# Patient Record
Sex: MALE | Race: WHITE | Employment: OTHER
[De-identification: names, ages, dates, MRNs, and addresses within clinical notes are randomized per-mention and may not be internally consistent; named-entity substitution may affect disease eponyms.]

---

## 2017-01-22 ENCOUNTER — HISTORIC RESULTS (OUTPATIENT)
Dept: ADMINISTRATIVE | Age: 52
End: 2017-01-22

## 2017-05-08 ENCOUNTER — HISTORIC RESULTS (OUTPATIENT)
Dept: ADMINISTRATIVE | Age: 52
End: 2017-05-08

## 2018-01-15 ENCOUNTER — HISTORIC RESULTS (OUTPATIENT)
Dept: ADMINISTRATIVE | Age: 53
End: 2018-01-15

## 2018-02-19 ENCOUNTER — HISTORIC RESULTS (OUTPATIENT)
Dept: ADMINISTRATIVE | Age: 53
End: 2018-02-19

## 2018-04-09 ENCOUNTER — HOSPITAL ENCOUNTER (EMERGENCY)
Facility: OTHER | Age: 53
Discharge: HOME | End: 2018-04-09
Attending: EMERGENCY MEDICINE | Admitting: EMERGENCY MEDICINE
Payer: MEDICARE

## 2018-04-09 ENCOUNTER — APPOINTMENT (OUTPATIENT)
Dept: GENERAL RADIOLOGY | Facility: OTHER | Age: 53
End: 2018-04-09
Attending: EMERGENCY MEDICINE
Payer: MEDICARE

## 2018-04-09 VITALS
TEMPERATURE: 97.9 F | OXYGEN SATURATION: 92 % | SYSTOLIC BLOOD PRESSURE: 113 MMHG | RESPIRATION RATE: 18 BRPM | DIASTOLIC BLOOD PRESSURE: 77 MMHG

## 2018-04-09 DIAGNOSIS — R07.9 ACUTE CHEST PAIN: Primary | ICD-10-CM

## 2018-04-09 DIAGNOSIS — R10.13 ABDOMINAL PAIN, EPIGASTRIC: ICD-10-CM

## 2018-04-09 LAB
ANION GAP SERPL CALCULATED.3IONS-SCNC: 10 MMOL/L (ref 3–14)
BASOPHILS # BLD AUTO: 0.1 10E9/L (ref 0–0.2)
BASOPHILS NFR BLD AUTO: 1.5 %
BUN SERPL-MCNC: 14 MG/DL (ref 7–25)
CALCIUM SERPL-MCNC: 9.1 MG/DL (ref 8.6–10.3)
CHLORIDE SERPL-SCNC: 107 MMOL/L (ref 98–107)
CO2 SERPL-SCNC: 22 MMOL/L (ref 21–31)
CREAT SERPL-MCNC: 0.87 MG/DL (ref 0.7–1.3)
DIFFERENTIAL METHOD BLD: NORMAL
EOSINOPHIL # BLD AUTO: 0.5 10E9/L (ref 0–0.7)
EOSINOPHIL NFR BLD AUTO: 5.2 %
ERYTHROCYTE [DISTWIDTH] IN BLOOD BY AUTOMATED COUNT: 13.8 % (ref 10–15)
GFR SERPL CREATININE-BSD FRML MDRD: >90 ML/MIN/1.7M2
GLUCOSE SERPL-MCNC: 85 MG/DL (ref 70–105)
HCT VFR BLD AUTO: 44.8 % (ref 40–53)
HGB BLD-MCNC: 16 G/DL (ref 13.3–17.7)
IMM GRANULOCYTES # BLD: 0 10E9/L (ref 0–0.4)
IMM GRANULOCYTES NFR BLD: 0.3 %
LYMPHOCYTES # BLD AUTO: 2.7 10E9/L (ref 0.8–5.3)
LYMPHOCYTES NFR BLD AUTO: 31.8 %
MCH RBC QN AUTO: 30.4 PG (ref 26.5–33)
MCHC RBC AUTO-ENTMCNC: 35.7 G/DL (ref 31.5–36.5)
MCV RBC AUTO: 85 FL (ref 78–100)
MONOCYTES # BLD AUTO: 0.7 10E9/L (ref 0–1.3)
MONOCYTES NFR BLD AUTO: 8 %
NEUTROPHILS # BLD AUTO: 4.6 10E9/L (ref 1.6–8.3)
NEUTROPHILS NFR BLD AUTO: 53.2 %
PLATELET # BLD AUTO: 319 10E9/L (ref 150–450)
POTASSIUM SERPL-SCNC: 4.1 MMOL/L (ref 3.5–5.1)
RBC # BLD AUTO: 5.27 10E12/L (ref 4.4–5.9)
SODIUM SERPL-SCNC: 139 MMOL/L (ref 134–144)
TROPONIN I SERPL-MCNC: <0.03 UG/L (ref 0–0.03)
TROPONIN I SERPL-MCNC: <0.03 UG/L (ref 0–0.03)
WBC # BLD AUTO: 8.6 10E9/L (ref 4–11)

## 2018-04-09 PROCEDURE — 93005 ELECTROCARDIOGRAM TRACING: CPT | Performed by: EMERGENCY MEDICINE

## 2018-04-09 PROCEDURE — 85025 COMPLETE CBC W/AUTO DIFF WBC: CPT | Performed by: EMERGENCY MEDICINE

## 2018-04-09 PROCEDURE — 94640 AIRWAY INHALATION TREATMENT: CPT

## 2018-04-09 PROCEDURE — 25000132 ZZH RX MED GY IP 250 OP 250 PS 637: Performed by: EMERGENCY MEDICINE

## 2018-04-09 PROCEDURE — 93010 ELECTROCARDIOGRAM REPORT: CPT | Performed by: INTERNAL MEDICINE

## 2018-04-09 PROCEDURE — 99285 EMERGENCY DEPT VISIT HI MDM: CPT | Mod: 25 | Performed by: EMERGENCY MEDICINE

## 2018-04-09 PROCEDURE — 25000125 ZZHC RX 250: Performed by: EMERGENCY MEDICINE

## 2018-04-09 PROCEDURE — 93005 ELECTROCARDIOGRAM TRACING: CPT | Mod: 76 | Performed by: EMERGENCY MEDICINE

## 2018-04-09 PROCEDURE — 36415 COLL VENOUS BLD VENIPUNCTURE: CPT | Mod: 91 | Performed by: EMERGENCY MEDICINE

## 2018-04-09 PROCEDURE — 84484 ASSAY OF TROPONIN QUANT: CPT | Mod: 91 | Performed by: EMERGENCY MEDICINE

## 2018-04-09 PROCEDURE — 80048 BASIC METABOLIC PNL TOTAL CA: CPT | Performed by: EMERGENCY MEDICINE

## 2018-04-09 PROCEDURE — 71046 X-RAY EXAM CHEST 2 VIEWS: CPT

## 2018-04-09 PROCEDURE — 40000275 ZZH STATISTIC RCP TIME EA 10 MIN

## 2018-04-09 PROCEDURE — 36415 COLL VENOUS BLD VENIPUNCTURE: CPT | Performed by: EMERGENCY MEDICINE

## 2018-04-09 PROCEDURE — 84484 ASSAY OF TROPONIN QUANT: CPT | Performed by: EMERGENCY MEDICINE

## 2018-04-09 PROCEDURE — 99284 EMERGENCY DEPT VISIT MOD MDM: CPT | Mod: Z6 | Performed by: EMERGENCY MEDICINE

## 2018-04-09 RX ORDER — NITROGLYCERIN 0.4 MG/1
0.4 TABLET SUBLINGUAL
COMMUNITY
Start: 2017-06-30

## 2018-04-09 RX ORDER — PREDNISONE 20 MG/1
TABLET ORAL
Qty: 10 TABLET | Refills: 0 | Status: SHIPPED | OUTPATIENT
Start: 2018-04-09 | End: 2019-03-25 | Stop reason: ALTCHOICE

## 2018-04-09 RX ORDER — PREDNISONE 20 MG/1
60 TABLET ORAL ONCE
Status: COMPLETED | OUTPATIENT
Start: 2018-04-09 | End: 2018-04-09

## 2018-04-09 RX ORDER — ALUMINA, MAGNESIA, AND SIMETHICONE 2400; 2400; 240 MG/30ML; MG/30ML; MG/30ML
15 SUSPENSION ORAL ONCE
Status: COMPLETED | OUTPATIENT
Start: 2018-04-09 | End: 2018-04-09

## 2018-04-09 RX ORDER — NITROGLYCERIN 0.4 MG/1
0.4 TABLET SUBLINGUAL EVERY 5 MIN PRN
Status: DISCONTINUED | OUTPATIENT
Start: 2018-04-09 | End: 2018-04-09 | Stop reason: HOSPADM

## 2018-04-09 RX ORDER — AZITHROMYCIN 250 MG/1
TABLET, FILM COATED ORAL
Qty: 6 TABLET | Refills: 0 | Status: SHIPPED | OUTPATIENT
Start: 2018-04-09 | End: 2018-04-14

## 2018-04-09 RX ORDER — IPRATROPIUM BROMIDE AND ALBUTEROL SULFATE 2.5; .5 MG/3ML; MG/3ML
3 SOLUTION RESPIRATORY (INHALATION)
Status: DISCONTINUED | OUTPATIENT
Start: 2018-04-09 | End: 2018-04-09 | Stop reason: HOSPADM

## 2018-04-09 RX ORDER — ALBUTEROL SULFATE 90 UG/1
2 AEROSOL, METERED RESPIRATORY (INHALATION) EVERY 6 HOURS PRN
Qty: 1 INHALER | Refills: 0 | Status: SHIPPED | OUTPATIENT
Start: 2018-04-09

## 2018-04-09 RX ADMIN — PREDNISONE 60 MG: 20 TABLET ORAL at 10:50

## 2018-04-09 RX ADMIN — NITROGLYCERIN 0.4 MG: 0.4 TABLET SUBLINGUAL at 11:53

## 2018-04-09 RX ADMIN — ALUMINUM HYDROXIDE, MAGNESIUM HYDROXIDE, AND DIMETHICONE 15 ML: 400; 400; 40 SUSPENSION ORAL at 12:13

## 2018-04-09 RX ADMIN — NITROGLYCERIN 0.4 MG: 0.4 TABLET SUBLINGUAL at 11:14

## 2018-04-09 RX ADMIN — NITROGLYCERIN 0.4 MG: 0.4 TABLET SUBLINGUAL at 11:26

## 2018-04-09 RX ADMIN — LIDOCAINE HYDROCHLORIDE 15 ML: 20 SOLUTION ORAL; TOPICAL at 12:13

## 2018-04-09 RX ADMIN — IPRATROPIUM BROMIDE AND ALBUTEROL SULFATE 3 ML: .5; 3 SOLUTION RESPIRATORY (INHALATION) at 11:00

## 2018-04-09 NOTE — ED AVS SNAPSHOT
[facility]    [address]    [place] [address]    Phone:  [phone]    Fax:  [phone]                                       Dung Marrero   MRN: [id number]    Department:  [facility]   Date of Visit:  4/9/2018           After Visit Summary Signature Page     I have received my discharge instructions, and my questions have been answered. I have discussed any challenges I see with this plan with the nurse or doctor.    ..........................................................................................................................................  Patient/Patient Representative Signature      ..........................................................................................................................................  Patient Representative Print Name and Relationship to Patient    ..................................................               ................................................  Date                                            Time    ..........................................................................................................................................  Reviewed by Signature/Title    ...................................................              ..............................................  Date                                                            Time

## 2018-04-09 NOTE — ED PROVIDER NOTES
"  History     Chief Complaint   Patient presents with     Shortness of Breath     HPI  Dung Marrero is a 52 year old male who since for evaluation of chest pain or shortness of breath. He reports months of intermittent chest tightness and shortness of breath.  He denies specific change that led him to come to the emergency department today.  Reports a productive cough sometimes of black sputum sometimes of yellow green tinged sputum. Reports hemoptysis that occurred maybe days or weeks ago but unable to give a real timeframe as he states \"I confuse my dates\".  He reports that he has been wandering in the woods after hitching rides with people driving by.  He states he been drinking water melted from ice and has had no food last few days.  He reports hunger pains.  Denies new lower extremity edema.  He does report may be a few days of night sweats.    He had previously been staining [place] and is unable to state why he is now in the woods around [place].  He describes long history of issues with his lungs including long smoking history, asbestos exposure, possible lung cancer etc.    Problem List:    There are no active problems to display for this patient.     Past Medical History:    COPD  Tobacco use  Delusional disorder  Hypertension    Past Surgical History:    Unclear history.    Family History:    No family history on file.    Social History:  Marital Status:   [4]  Social History   Substance Use Topics     Smoking status: Not on file     Smokeless tobacco: Not on file     Alcohol use Not on file      Medications:      No current outpatient prescriptions on file.  He comes with bottles of levofloxacin, nitro and prednisone from 2 months ago with pills still remaining.    Review of Systems  A ten point ROS was completed with pertinent positives and negatives noted in HPI; otherwise negative.     Physical Exam   BP: (!) 134/107  Temp: 97.9  F (36.6  C)  Resp: 26  SpO2: 96 %    Physical Exam  Gen: " Alert, oriented. Non-toxic appearing.   HEENT: Normocephalic. Conjunctivae without injection. No scleral icterus.   CV: RRR, no clear murmur appreciated  Peripheral vascular: No significant lower extremity edema. Warm extremities.   Respiratory: Mild tachypnea noted anxious appearing use of sensory muscle use. There are no wheezes across his lung fields or stridor. No wheezing or stridor appreciated.   Abdomen/GI: Soft, non-tender. Non-distended. No rebound tenderness appreciated.   : No CVA tenderness.   MSK: No gross bony deformity.  No calf tenderness.  Heme/lymph: No ecchymoses noted.    Neuro: Alert, oriented. CN 2-12 grossly intact.   Psych: Not responding to internal stimuli.  Rambling discourse regarding past medical history and social life.     ED Course     ED Course     Procedures       EKG Interpretation:      Interpreted by Truong Cedillo  Time reviewed: 10:40  Symptoms at time of EKG: Chest pain, SOB   Rhythm: normal sinus   Rate: Normal   Axis: Right Axis Deviation  Ectopy: none  Conduction: normal  ST Segments/ T Waves: Inferior St or J point elevation, no other T wave changes  Q Waves: none  Comparison to prior: unchanged from 1/15/2018    Clinical Impression: no acute changes    Results for orders placed or performed during the hospital encounter of 04/09/18 (from the past 24 hour(s))   CBC with platelets differential   Result Value Ref Range    WBC 8.6 4.0 - 11.0 10e9/L    RBC Count 5.27 4.4 - 5.9 10e12/L    Hemoglobin 16.0 13.3 - 17.7 g/dL    Hematocrit 44.8 40.0 - 53.0 %    MCV 85 78 - 100 fl    MCH 30.4 26.5 - 33.0 pg    MCHC 35.7 31.5 - 36.5 g/dL    RDW 13.8 10.0 - 15.0 %    Platelet Count 319 150 - 450 10e9/L    Diff Method Automated Method     % Neutrophils 53.2 %    % Lymphocytes 31.8 %    % Monocytes 8.0 %    % Eosinophils 5.2 %    % Basophils 1.5 %    % Immature Granulocytes 0.3 %    Absolute Neutrophil 4.6 1.6 - 8.3 10e9/L    Absolute Lymphocytes 2.7 0.8 - 5.3 10e9/L    Absolute  Monocytes 0.7 0.0 - 1.3 10e9/L    Absolute Eosinophils 0.5 0.0 - 0.7 10e9/L    Absolute Basophils 0.1 0.0 - 0.2 10e9/L    Abs Immature Granulocytes 0.0 0 - 0.4 10e9/L     Medications   ipratropium - albuterol 0.5 mg/2.5 mg/3 mL (DUONEB) neb solution 3 mL (3 mLs Nebulization Given 4/9/18 1100)   nitroGLYcerin (NITROSTAT) sublingual tablet 0.4 mg (0.4 mg Sublingual Given 4/9/18 1153)   alum & mag hydroxide-simethicone (MYLANTA ES/MAALOX  ES) suspension 15 mL (not administered)     And   lidocaine (viscous) (XYLOCAINE) 2 % solution 15 mL (not administered)   predniSONE (DELTASONE) tablet 60 mg (60 mg Oral Given 4/9/18 1050)     Assessments & Plan (with Medical Decision Making)   Dung Marrero is a 52 year old M with medical history significant for delusion disorder, COPD presenting for evaluation of chest pain and SOB. Differential considered included COPD, PE, ACS, pulmonary edema, heart failure, pneumonia, influenza, neoplasm, cardiac arrhythmia among others. I reviewed nursing notes and patient's vitals on arrival. Examination was significant for nontoxic-appearing male with mild tachypnea and large upper airway sounds without focal wheezing over the bilateral lung fields.    Labs are notable for normal CBC, BMP and troponin. Chest x-ray showed hyperinflated lungs with scattered nodules. I compared this to patient's previous CTs which is notable for diffuse areas of lung scarring.  No indication for emergent CT as he has had multiple over the last year.    Reports nitro does improve his symptoms. On repeat history patient reports 1 week of ongoing exertional chest discomfort in addition to his shortness of breath. Repeat EKG showed no significant change with ongoing similar symptoms. GI cocktail was given as his symptoms seem to be acutely worse after starting to eat a meat ball.  GI cocktail completely resolved all of his chest pain.   In addition I reviewed patient's care everywhere records which are notable for  dobutamine stress test on the February 20th less than 2 months ago. This was negative for ischemia.  EF of greater than 75% and no ischemic changes on his EKG. This was despite patient developing 10 out of 10 chest pain during the study.  In addition he has had multiple other studies including a CT PE study at the end of February. I have low suspicion for new PE in this patient.    Repeat troponin was also within normal limits.  Patient's HEART score 3, making adverse cardiac outcome unlikely..    He was seen by  and was given resources for a facility to stay at for the evening.    He was discharged with ranitidine, prednisone, inhaler and azithromycin.  Patient reports not having insurance probably for the next few weeks.  Return with any worsening pain ability to follow-up with his previous providers as able.      New Prescriptions    ALBUTEROL (PROAIR HFA/PROVENTIL HFA/VENTOLIN HFA) 108 (90 BASE) MCG/ACT INHALER    Inhale 2 puffs into the lungs every 6 hours as needed for shortness of breath / dyspnea or wheezing    AZITHROMYCIN (ZITHROMAX Z-YANIQUE) 250 MG TABLET    Two tablets on the first day, then one tablet daily for the next 4 days    PREDNISONE (DELTASONE) 20 MG TABLET    Take two tablets (= 40mg) each day for 5 (five) days    RANITIDINE (ZANTAC) 150 MG TABLET    Take 2 tablets (300 mg) by mouth At Bedtime for 10 days     Final diagnoses:   Acute chest pain   Abdominal pain, epigastric     4/9/2018   [facility]  04/09/18 5791

## 2018-04-09 NOTE — ED NOTES
"Pt has only 1 lung(left), presents with SOB, Hx of COPD, productive cough-pt reports \"black stuff\". SOB been getting worse of the last 2 months, Pt reports he had pneumonia \"a little while ago\" SP02 98% on R/A  "

## 2018-04-09 NOTE — DISCHARGE INSTRUCTIONS
Please take Zantac for your stomach acid.  Gave you a prescription of prednisone and inhaler to help with her breathing.  Please follow-up with your [facility] provider.   has given you resources to find a place tonight at the shelter.

## 2018-04-09 NOTE — ED NOTES
Social Service consult ordered per MD. RAMIREZ.S contacted. [place]

## 2018-04-09 NOTE — PROGRESS NOTES
:    Received referral to see patient regarding possible resources as he is homeless.  Met with patient room to discuss discharge plan.  Patient stated he is from the [place] area but has been all over the [place].  Patients stated he is trying to get back to [place] as he is having problems with his social security.  I offered patient to go to the [facility] and he was receptive.  He stated he has never been there before.  I gave him contact information as he has to go there in person for an assessment first. Patient did not have any other questions or concerns at this time.  I gave MD and nurse update.  They were unsure at that time if patient might be admitted.    JIMENA Camejo on 4/9/2018 at 11:22 AM

## 2018-04-09 NOTE — ED NOTES
"Pt does not have a permanent residence, dressed in many layers today, pt was living in the woods, and is trying to get to [place] to \"straighten out his social security\"  "

## 2019-03-25 ENCOUNTER — HOSPITAL ENCOUNTER (EMERGENCY)
Facility: OTHER | Age: 54
Discharge: HOME | End: 2019-03-25
Attending: PHYSICIAN ASSISTANT | Admitting: PHYSICIAN ASSISTANT
Payer: MEDICARE

## 2019-03-25 ENCOUNTER — APPOINTMENT (OUTPATIENT)
Dept: GENERAL RADIOLOGY | Facility: OTHER | Age: 54
End: 2019-03-25
Attending: PHYSICIAN ASSISTANT
Payer: MEDICARE

## 2019-03-25 VITALS
TEMPERATURE: 98.3 F | SYSTOLIC BLOOD PRESSURE: 114 MMHG | OXYGEN SATURATION: 99 % | DIASTOLIC BLOOD PRESSURE: 81 MMHG | HEART RATE: 67 BPM | RESPIRATION RATE: 18 BRPM

## 2019-03-25 DIAGNOSIS — R09.02 HYPOXIA: ICD-10-CM

## 2019-03-25 DIAGNOSIS — J44.1 COPD EXACERBATION (H): ICD-10-CM

## 2019-03-25 LAB
ALBUMIN SERPL-MCNC: 3.9 G/DL (ref 3.5–5.7)
ALP SERPL-CCNC: 53 U/L (ref 34–104)
ALT SERPL W P-5'-P-CCNC: 13 U/L (ref 7–52)
ANION GAP SERPL CALCULATED.3IONS-SCNC: 4 MMOL/L (ref 3–14)
AST SERPL W P-5'-P-CCNC: 14 U/L (ref 13–39)
BASOPHILS # BLD AUTO: 0.1 10E9/L (ref 0–0.2)
BASOPHILS NFR BLD AUTO: 1.7 %
BILIRUB SERPL-MCNC: 0.4 MG/DL (ref 0.3–1)
BUN SERPL-MCNC: 8 MG/DL (ref 7–25)
CALCIUM SERPL-MCNC: 8.7 MG/DL (ref 8.6–10.3)
CHLORIDE SERPL-SCNC: 105 MMOL/L (ref 98–107)
CO2 SERPL-SCNC: 29 MMOL/L (ref 21–31)
CREAT SERPL-MCNC: 0.96 MG/DL (ref 0.7–1.3)
D DIMER PPP DDU-MCNC: <200 NG/ML D-DU (ref 0–230)
DIFFERENTIAL METHOD BLD: NORMAL
EOSINOPHIL # BLD AUTO: 0.2 10E9/L (ref 0–0.7)
EOSINOPHIL NFR BLD AUTO: 3.4 %
ERYTHROCYTE [DISTWIDTH] IN BLOOD BY AUTOMATED COUNT: 14.1 % (ref 10–15)
GFR SERPL CREATININE-BSD FRML MDRD: 82 ML/MIN/{1.73_M2}
GLUCOSE SERPL-MCNC: 148 MG/DL (ref 70–105)
HCO3 BLD-SCNC: 22 MMOL/L (ref 22–28)
HCT VFR BLD AUTO: 42.5 % (ref 40–53)
HGB BLD-MCNC: 14.3 G/DL (ref 13.3–17.7)
IMM GRANULOCYTES # BLD: 0 10E9/L (ref 0–0.4)
IMM GRANULOCYTES NFR BLD: 0.2 %
INR PPP: 1.04 (ref 0–1.3)
LYMPHOCYTES # BLD AUTO: 1.7 10E9/L (ref 0.8–5.3)
LYMPHOCYTES NFR BLD AUTO: 35.6 %
MCH RBC QN AUTO: 29.9 PG (ref 26.5–33)
MCHC RBC AUTO-ENTMCNC: 33.6 G/DL (ref 31.5–36.5)
MCV RBC AUTO: 89 FL (ref 78–100)
MONOCYTES # BLD AUTO: 0.3 10E9/L (ref 0–1.3)
MONOCYTES NFR BLD AUTO: 6.5 %
NEUTROPHILS # BLD AUTO: 2.5 10E9/L (ref 1.6–8.3)
NEUTROPHILS NFR BLD AUTO: 52.6 %
O2/TOTAL GAS SETTING VFR VENT: ABNORMAL %
OXYHGB MFR BLD: 89 %
PCO2 BLD: 34 MM HG (ref 35–45)
PH BLD: 7.44 PH (ref 7.35–7.45)
PLATELET # BLD AUTO: 243 10E9/L (ref 150–450)
PO2 BLD: 75 MM HG (ref 83–108)
POTASSIUM SERPL-SCNC: 3.9 MMOL/L (ref 3.5–5.1)
PROT SERPL-MCNC: 6.6 G/DL (ref 6.4–8.9)
RBC # BLD AUTO: 4.79 10E12/L (ref 4.4–5.9)
SODIUM SERPL-SCNC: 138 MMOL/L (ref 134–144)
TROPONIN I SERPL-MCNC: <0.03 UG/L (ref 0–0.03)
TROPONIN I SERPL-MCNC: <0.03 UG/L (ref 0–0.03)
WBC # BLD AUTO: 4.8 10E9/L (ref 4–11)

## 2019-03-25 PROCEDURE — 84484 ASSAY OF TROPONIN QUANT: CPT | Performed by: PHYSICIAN ASSISTANT

## 2019-03-25 PROCEDURE — 36415 COLL VENOUS BLD VENIPUNCTURE: CPT | Performed by: PHYSICIAN ASSISTANT

## 2019-03-25 PROCEDURE — 40000275 ZZH STATISTIC RCP TIME EA 10 MIN

## 2019-03-25 PROCEDURE — 80053 COMPREHEN METABOLIC PANEL: CPT | Performed by: PHYSICIAN ASSISTANT

## 2019-03-25 PROCEDURE — 25000125 ZZHC RX 250: Performed by: PHYSICIAN ASSISTANT

## 2019-03-25 PROCEDURE — 85379 FIBRIN DEGRADATION QUANT: CPT | Performed by: PHYSICIAN ASSISTANT

## 2019-03-25 PROCEDURE — 99283 EMERGENCY DEPT VISIT LOW MDM: CPT | Mod: Z6 | Performed by: PHYSICIAN ASSISTANT

## 2019-03-25 PROCEDURE — 36415 COLL VENOUS BLD VENIPUNCTURE: CPT | Mod: 91 | Performed by: PHYSICIAN ASSISTANT

## 2019-03-25 PROCEDURE — 36600 WITHDRAWAL OF ARTERIAL BLOOD: CPT | Performed by: PHYSICIAN ASSISTANT

## 2019-03-25 PROCEDURE — 84484 ASSAY OF TROPONIN QUANT: CPT | Mod: 91 | Performed by: PHYSICIAN ASSISTANT

## 2019-03-25 PROCEDURE — 93005 ELECTROCARDIOGRAM TRACING: CPT | Performed by: PHYSICIAN ASSISTANT

## 2019-03-25 PROCEDURE — 82805 BLOOD GASES W/O2 SATURATION: CPT | Performed by: PHYSICIAN ASSISTANT

## 2019-03-25 PROCEDURE — 71046 X-RAY EXAM CHEST 2 VIEWS: CPT

## 2019-03-25 PROCEDURE — 93010 ELECTROCARDIOGRAM REPORT: CPT | Performed by: INTERNAL MEDICINE

## 2019-03-25 PROCEDURE — 96374 THER/PROPH/DIAG INJ IV PUSH: CPT | Performed by: PHYSICIAN ASSISTANT

## 2019-03-25 PROCEDURE — 25000128 H RX IP 250 OP 636: Performed by: PHYSICIAN ASSISTANT

## 2019-03-25 PROCEDURE — 25800030 ZZH RX IP 258 OP 636: Performed by: PHYSICIAN ASSISTANT

## 2019-03-25 PROCEDURE — 85025 COMPLETE CBC W/AUTO DIFF WBC: CPT | Performed by: PHYSICIAN ASSISTANT

## 2019-03-25 PROCEDURE — 85610 PROTHROMBIN TIME: CPT | Performed by: PHYSICIAN ASSISTANT

## 2019-03-25 PROCEDURE — 99285 EMERGENCY DEPT VISIT HI MDM: CPT | Mod: 25 | Performed by: PHYSICIAN ASSISTANT

## 2019-03-25 PROCEDURE — 94640 AIRWAY INHALATION TREATMENT: CPT

## 2019-03-25 RX ORDER — SODIUM CHLORIDE 9 MG/ML
INJECTION, SOLUTION INTRAVENOUS CONTINUOUS
Status: DISCONTINUED | OUTPATIENT
Start: 2019-03-25 | End: 2019-03-25 | Stop reason: HOSPADM

## 2019-03-25 RX ORDER — METHYLPREDNISOLONE SODIUM SUCCINATE 125 MG/2ML
125 INJECTION, POWDER, LYOPHILIZED, FOR SOLUTION INTRAMUSCULAR; INTRAVENOUS ONCE
Status: COMPLETED | OUTPATIENT
Start: 2019-03-25 | End: 2019-03-25

## 2019-03-25 RX ORDER — IPRATROPIUM BROMIDE AND ALBUTEROL SULFATE 2.5; .5 MG/3ML; MG/3ML
3 SOLUTION RESPIRATORY (INHALATION) ONCE
Status: COMPLETED | OUTPATIENT
Start: 2019-03-25 | End: 2019-03-25

## 2019-03-25 RX ORDER — PREDNISONE 10 MG/1
TABLET ORAL
Qty: 45 TABLET | Refills: 0 | Status: SHIPPED | OUTPATIENT
Start: 2019-03-25

## 2019-03-25 RX ORDER — IPRATROPIUM BROMIDE AND ALBUTEROL SULFATE 2.5; .5 MG/3ML; MG/3ML
1 SOLUTION RESPIRATORY (INHALATION) 4 TIMES DAILY PRN
Qty: 90 VIAL | Refills: 3 | Status: SHIPPED | OUTPATIENT
Start: 2019-03-25

## 2019-03-25 RX ORDER — BUDESONIDE 0.5 MG/2ML
0.5 INHALANT ORAL ONCE
Status: COMPLETED | OUTPATIENT
Start: 2019-03-25 | End: 2019-03-25

## 2019-03-25 RX ADMIN — IPRATROPIUM BROMIDE AND ALBUTEROL SULFATE 3 ML: .5; 3 SOLUTION RESPIRATORY (INHALATION) at 10:56

## 2019-03-25 RX ADMIN — METHYLPREDNISOLONE SODIUM SUCCINATE 125 MG: 125 INJECTION, POWDER, FOR SOLUTION INTRAMUSCULAR; INTRAVENOUS at 11:13

## 2019-03-25 RX ADMIN — SODIUM CHLORIDE: 9 INJECTION, SOLUTION INTRAVENOUS at 11:12

## 2019-03-25 RX ADMIN — BUDESONIDE 0.5 MG: 0.5 INHALANT RESPIRATORY (INHALATION) at 10:56

## 2019-03-25 ASSESSMENT — ENCOUNTER SYMPTOMS
ABDOMINAL PAIN: 0
CONFUSION: 0
WOUND: 0
ADENOPATHY: 0
CHEST TIGHTNESS: 1
BACK PAIN: 0
HEMATURIA: 0
FEVER: 0
BRUISES/BLEEDS EASILY: 0
COUGH: 1
SHORTNESS OF BREATH: 1
CHILLS: 0
WHEEZING: 0

## 2019-03-25 NOTE — ED AVS SNAPSHOT
[facility]  [address]  [place] [address]  Phone:  [phone]  Fax:  [phone]                                    Dung Marrero   MRN: [id number]    Department:  [facility]   Date of Visit:  3/25/2019           After Visit Summary Signature Page    I have received my discharge instructions, and my questions have been answered. I have discussed any challenges I see with this plan with the nurse or doctor.    ..........................................................................................................................................  Patient/Patient Representative Signature      ..........................................................................................................................................  Patient Representative Print Name and Relationship to Patient    ..................................................               ................................................  Date                                   Time    ..........................................................................................................................................  Reviewed by Signature/Title    ...................................................              ..............................................  Date                                               Time          22EPIC Rev 08/18

## 2019-03-25 NOTE — ED PROVIDER NOTES
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  Furthermore he has a history of exposure excessive tobacco use and cigarette use.  He has quit a year ago but continues with the cigarettes.  Most of his medical evaluation has been through [facility].  He reports having significant pulmonary and cardiac history with 23 heart attacks, and this extends in the past.  Has had lung cancer with part of his right lung removed.  Today he is actually here for a friend who is undergoing surgery.  The patient was walking the sood when Dr. Metz noted he was somewhat hypoxic.  He asked the patient if he was having difficulties and the patient said yes at that point the patient was placed in a wheelchair and escorted to the ER for further evaluation.              Allergies:  Allergies   Allergen Reactions     Aspirin Anaphylaxis     Latex Anaphylaxis     Penicillins Anaphylaxis     No Clinical Screening - See Comments      Perfumes: cause nose bleeds       Problem List:    There are no active problems to display for this patient.       Past Medical History:    No past medical history on file.    Past Surgical History:    No past surgical history on file.    Family History:    No family history on file.    Social History:  Marital Status:   [4]  Social History     Tobacco Use     Smoking status: Not on file   Substance Use Topics     Alcohol use: Not on file     Drug use: Not on file        Medications:      albuterol (PROAIR HFA/PROVENTIL HFA/VENTOLIN HFA) 108 (90 BASE) MCG/ACT Inhaler   predniSONE (DELTASONE) 20 MG tablet   TRAMADOL HCL PO   LEVOFLOXACIN PO   nitroGLYcerin (NITROSTAT) 0.4 MG sublingual tablet         Review of Systems   Constitutional: Negative for chills and fever.   HENT: Negative for congestion.     Eyes: Negative for visual disturbance.   Respiratory: Positive for cough, chest tightness and shortness of breath. Negative for wheezing.    Cardiovascular: Positive for chest pain.   Gastrointestinal: Negative for abdominal pain.   Genitourinary: Negative for hematuria.   Musculoskeletal: Negative for back pain.   Skin: Negative for rash and wound.   Neurological: Negative for syncope.   Hematological: Negative for adenopathy. Does not bruise/bleed easily.   Psychiatric/Behavioral: Negative for confusion.       Physical Exam   BP: (!) 133/95  Pulse: 78  Heart Rate: 70  Temp: 98.3  F (36.8  C)  Resp: 22  SpO2: 97 %    Vitals:    03/25/19 1215 03/25/19 1230 03/25/19 1245 03/25/19 1300   BP: 114/82 117/76 125/71 114/81   Pulse: 66 65 69 67   Resp:       Temp:       TempSrc:       SpO2: 97% 98% 97% 99%       Physical Exam   Constitutional: He is oriented to person, place, and time. He appears well-developed and well-nourished. No distress.   HENT:   Head: Normocephalic and atraumatic.   Eyes: Conjunctivae are normal. No scleral icterus.   Neck: Neck supple.   Cardiovascular: Normal rate and regular rhythm.   Pulmonary/Chest: Effort normal. No respiratory distress. He has no rales.   Lung sounds decreased throughout.  SaO2 is 97% on room air.   Abdominal: Soft. There is no tenderness.   Musculoskeletal: Normal range of motion. He exhibits no deformity.   Lymphadenopathy:     He has no cervical adenopathy.   Neurological: He is alert and oriented to person, place, and time.   Skin: Skin is warm and dry. Capillary refill takes less than 2 seconds. No rash noted. He is not diaphoretic.   Psychiatric: He has a normal mood and affect.       ED Course     ED Course as of Mar 25 1323   Mon Mar 25, 2019   1322 Sodium: 138     Procedures          EKG shows normal sinus rhythm.  Rightward axis.  Heart rate is 77.  No previous EKG for comparison.         Results for orders placed or performed during the hospital encounter of  03/25/19 (from the past 24 hour(s))   CBC with platelets differential   Result Value Ref Range    WBC 4.8 4.0 - 11.0 10e9/L    RBC Count 4.79 4.4 - 5.9 10e12/L    Hemoglobin 14.3 13.3 - 17.7 g/dL    Hematocrit 42.5 40.0 - 53.0 %    MCV 89 78 - 100 fl    MCH 29.9 26.5 - 33.0 pg    MCHC 33.6 31.5 - 36.5 g/dL    RDW 14.1 10.0 - 15.0 %    Platelet Count 243 150 - 450 10e9/L    Diff Method Automated Method     % Neutrophils 52.6 %    % Lymphocytes 35.6 %    % Monocytes 6.5 %    % Eosinophils 3.4 %    % Basophils 1.7 %    % Immature Granulocytes 0.2 %    Absolute Neutrophil 2.5 1.6 - 8.3 10e9/L    Absolute Lymphocytes 1.7 0.8 - 5.3 10e9/L    Absolute Monocytes 0.3 0.0 - 1.3 10e9/L    Absolute Eosinophils 0.2 0.0 - 0.7 10e9/L    Absolute Basophils 0.1 0.0 - 0.2 10e9/L    Abs Immature Granulocytes 0.0 0 - 0.4 10e9/L   INR   Result Value Ref Range    INR 1.04 0 - 1.3   Comprehensive metabolic panel   Result Value Ref Range    Sodium 138 134 - 144 mmol/L    Potassium 3.9 3.5 - 5.1 mmol/L    Chloride 105 98 - 107 mmol/L    Carbon Dioxide 29 21 - 31 mmol/L    Anion Gap 4 3 - 14 mmol/L    Glucose 148 (H) 70 - 105 mg/dL    Urea Nitrogen 8 7 - 25 mg/dL    Creatinine 0.96 0.70 - 1.30 mg/dL    GFR Estimate 82 >60 mL/min/[1.73_m2]    GFR Estimate If Black >90 >60 mL/min/[1.73_m2]    Calcium 8.7 8.6 - 10.3 mg/dL    Bilirubin Total 0.4 0.3 - 1.0 mg/dL    Albumin 3.9 3.5 - 5.7 g/dL    Protein Total 6.6 6.4 - 8.9 g/dL    Alkaline Phosphatase 53 34 - 104 U/L    ALT 13 7 - 52 U/L    AST 14 13 - 39 U/L   D-Dimer (HI,GH)   Result Value Ref Range    D-Dimer ng/mL <200 0 - 230 ng/ml D-DU   Troponin I   Result Value Ref Range    Troponin I ES <0.030 0.000 - 0.034 ug/L   Blood gas arterial and oxyhgb   Result Value Ref Range    pH Arterial 7.44 7.35 - 7.45 pH    pCO2 Arterial 34 (L) 35 - 45 mm Hg    pO2 Arterial 75 (L) 83 - 108 mm Hg    Bicarbonate Arterial 22 22 - 28 mmol/L    FIO2 Room Air     Oxyhemoglobin Arterial 89 (L) >95 %   XR Chest 2  Views    Narrative    PROCEDURE:  XR CHEST 2 VW    HISTORY:  hypoxia.     COMPARISON:  4/9/2018    FINDINGS:   The cardiac silhouette is normal in size. The pulmonary vasculature is  normal.  The lungs are hyperinflated with emphysematous changes,  similar to the prior study. No pleural effusion or pneumothorax.      Impression    IMPRESSION:  COPD. No acute change.      MAGDALENA GOODMAN MD   Troponin I (second draw)   Result Value Ref Range    Troponin I ES <0.030 0.000 - 0.034 ug/L       Medications   sodium chloride 0.9% infusion (not administered)   ipratropium - albuterol 0.5 mg/2.5 mg/3 mL (DUONEB) neb solution 3 mL (not administered)   budesonide (PULMICORT) neb solution 0.5 mg (not administered)   methylPREDNISolone sodium succinate (solu-MEDROL) injection 125 mg (not administered)       Assessments & Plan (with Medical Decision Making)     I have reviewed the nursing notes.    I have reviewed the findings, diagnosis, plan and need for follow up with the patient.         Medication List      Started    ipratropium - albuterol 0.5 mg/2.5 mg/3 mL 0.5-2.5 (3) MG/3ML neb solution  Commonly known as:  DUONEB  1 vial, Nebulization, 4 TIMES DAILY PRN        Modified    predniSONE 10 MG tablet  Commonly known as:  DELTASONE  Take 6 tablets ( 60 mg) daily for 5 days,  take 5 tablets ( 50 mg ) on day #6,  4 tablets ( 40 mg ) on day #7, 3 tablets ( 30 mg ) on day #8, 2 tablets (20 mg ) on day #9, and finally 1 tablet on day #10  What changed:      medication strength    additional instructions            Final diagnoses:   COPD exacerbation (H)   Hypoxia     Afebrile.  Vital signs stable.  Patient seen in the hallway with some hypoxia and difficulty breathing.  Brought to the ER for further evaluation.  Significant history of coronary stents as well as pulmonary issues.  IV established and was given fluids.  He was given DuoNeb, Pulmicort, and Solu-Medrol initially.  EKG shows normal sinus rhythm.  Rightward axis.  Heart  rate is 77.  No previous EKG for comparison.  Initial troponin is normal.  D-dimer is normal.  INR is 1.04.  ABG shows a pH of 7.44, PCO2 is 34, PO2 is 75, and SaO2 is 89% on room air.  He does admit he is on oxygen at home.  CBC shows normal white blood cells no left shift.  CMP is unremarkable.  Chest x-ray shows hyperinflated lungs and COPD.  No infiltrate.  He is feeling much better after the above treatment.  COPD exacerbation.  Temporary hypoxia.  90-minute troponin returns and this is normal as well this reassuring.  I discussed close follow-up with his primary care provider days for further evaluation and he will arrange this.  Follow-up sooner if there is any other concerns problems or questions.  3/25/2019   [facility]     Paulo King PA-C  03/25/19 5640

## 2019-03-25 NOTE — ED TRIAGE NOTES
Patient was walking in halls when Dr. Mckeon found him slumped over SOB and reporting chest pain. Patient has significant pulmonary and cardiac history. Reporting 23 heart attacks, and 6 stents in past. Has lung cancer and part of lung removed. Patient reporting the pain is similar to past heart attacks. Patient is here for friend that is having surgery, otherwise doctors in [place]. Patient did neb treatment in OR waiting room

## (undated) RX ORDER — METHYLPREDNISOLONE SODIUM SUCCINATE 125 MG/2ML
INJECTION, POWDER, LYOPHILIZED, FOR SOLUTION INTRAMUSCULAR; INTRAVENOUS
Status: DISPENSED
Start: 2019-03-25

## (undated) RX ORDER — SODIUM CHLORIDE 9 MG/ML
INJECTION, SOLUTION INTRAVENOUS
Status: DISPENSED
Start: 2019-03-25

## (undated) RX ORDER — IPRATROPIUM BROMIDE AND ALBUTEROL SULFATE 2.5; .5 MG/3ML; MG/3ML
SOLUTION RESPIRATORY (INHALATION)
Status: DISPENSED
Start: 2019-03-25

## (undated) RX ORDER — BUDESONIDE 0.5 MG/2ML
INHALANT ORAL
Status: DISPENSED
Start: 2019-03-25

## (undated) RX ORDER — PREDNISONE 20 MG/1
TABLET ORAL
Status: DISPENSED
Start: 2018-04-09

## (undated) RX ORDER — IPRATROPIUM BROMIDE AND ALBUTEROL SULFATE 2.5; .5 MG/3ML; MG/3ML
SOLUTION RESPIRATORY (INHALATION)
Status: DISPENSED
Start: 2018-04-09